# Patient Record
Sex: FEMALE | Race: WHITE
[De-identification: names, ages, dates, MRNs, and addresses within clinical notes are randomized per-mention and may not be internally consistent; named-entity substitution may affect disease eponyms.]

---

## 2017-12-04 LAB
BASOPHILS # BLD: 0.03 K/UL (ref 0–0.2)
BASOPHILS NFR BLD: 0.3 %
BUN SERPL-MCNC: 24 MG/DL (ref 7–18)
CALCIUM SERPL-MCNC: 9.6 MG/DL (ref 8.5–10.1)
CO2 SERPL-SCNC: 27 MMOL/L (ref 21–32)
CREAT SERPL-MCNC: 1.07 MG/DL (ref 0.6–1.2)
EOS ABS #: 0.65 K/UL (ref 0–0.5)
EOSINOPHIL NFR BLD AUTO: 226 K/UL (ref 130–400)
GLUCOSE SERPL-MCNC: 86 MG/DL (ref 70–99)
HCT VFR BLD CALC: 42.8 % (ref 37–47)
HGB BLD-MCNC: 14.7 G/DL (ref 12–16)
IG#: 0.02 K/UL (ref 0–0.02)
IMM GRANULOCYTES NFR BLD AUTO: 26.4 %
INR PPP: 0.9 (ref 0.9–1.1)
LYMPHOCYTES # BLD: 2.67 K/UL (ref 1.2–3.4)
MCH RBC QN AUTO: 29.9 PG (ref 25–34)
MCHC RBC AUTO-ENTMCNC: 34.3 G/DL (ref 32–36)
MCV RBC AUTO: 87.2 FL (ref 80–100)
MONO ABS #: 0.89 K/UL (ref 0.11–0.59)
MONOCYTES NFR BLD: 8.8 %
NEUT ABS #: 5.85 K/UL (ref 1.4–6.5)
NEUTROPHILS # BLD AUTO: 6.4 %
NEUTROPHILS NFR BLD AUTO: 57.9 %
PMV BLD AUTO: 9.8 FL (ref 7.4–10.4)
POTASSIUM SERPL-SCNC: 4.3 MMOL/L (ref 3.5–5.1)
PTT PATIENT: 24.9 SECONDS (ref 21–31)
RED CELL DISTRIBUTION WIDTH CV: 13.2 % (ref 11.5–14.5)
RED CELL DISTRIBUTION WIDTH SD: 42.2 FL (ref 36.4–46.3)
SODIUM SERPL-SCNC: 138 MMOL/L (ref 136–145)
WBC # BLD AUTO: 10.11 K/UL (ref 4.8–10.8)

## 2017-12-04 NOTE — PAT MEDICATION INSTRUCTIONS
Service Date


Dec 4, 2017.





Current Home Medication List


Aspirin (Aspirin Ec), 81 MG PO HS


Diclofenac (Voltaren), 75 MG PO BID PRN for PRN


Diltiazem Hcl Coated Beads (Cardizem Cd), 240 MG PO QAM


Fluticasone Propionate (Nasal) (Flonase Allergy Relief), 2 SPRAYS INTNAS QAM


Irbesartan (Avapro), 1 TAB PO QAM


Magnesium Oxide (Mag-Ox), 400 MG PO HS


Melatonin (Melatonin Maximum Strengt), 1 TAB PO HS


Multivitamin (Multivitamin), 1 TAB PO QAM


Naproxen (Aleve), 440 MG PO PRN


Omega-3 Fatty Acids (Fish Oil), 1 TAB PO BID


Omeprazole (Prilosec), 20 MG PO QAM





Medication Instructions


For Your Scheduled Surgery 





- Hold the following medications 2 weeks prior to surgery:


Omega-3 Fatty Acids (Fish Oil), 1 TAB PO BID











- Hold the following medications the morning of surgery:


Multivitamin (Multivitamin), 1 TAB PO QAM


Irbesartan (Avapro), 1 TAB PO QAM


Diclofenac (Voltaren), 75 MG PO BID PRN for PRN (otherwise okay to continue per 

surgeon)


Naproxen (Aleve), 440 MG PO PRN  (otherwise okay to continue per surgeon)








- Take the following medications the morning of surgery with a sip of water 

OTHERWISE NOTHING TO EAT OR DRINK AFTER MIDNIGHT:


Omeprazole (Prilosec), 20 MG PO QAM


Diltiazem Hcl Coated Beads (Cardizem Cd), 240 MG PO QAM


Fluticasone Propionate (Nasal) (Flonase Allergy Relief), 2 SPRAYS INTNAS QAM











- Take the following medications as scheduled the night before surgery:


Aspirin (Aspirin Ec), 81 MG PO HS


Magnesium Oxide (Mag-Ox), 400 MG PO HS


Melatonin (Melatonin Maximum Strengt), 1 TAB PO HS











If you have any questions please call us at [phone] or [phone] or 

[phone]

## 2017-12-04 NOTE — DIAGNOSTIC IMAGING REPORT
CHEST 2 VIEWS ROUTINE



CLINICAL HISTORY: PAT preoperative evaluation



COMPARISON STUDY:  No previous studies for comparison.



FINDINGS: The bones soft tissues and hemidiaphragms are normal. The

cardiomediastinal silhouette is normal. The lungs are clear. The pulmonary

vasculature is normal. 



IMPRESSION:  Negative chest. 











The above report was generated using voice recognition software.  It may contain

grammatical, syntax or spelling errors.









Electronically signed by:  Junacho Canada M.D.

12/4/2017 2:43 PM



Dictated Date/Time:  12/4/2017 2:43 PM

## 2017-12-28 NOTE — HISTORY & PHYSICAL EXAMINATION
DATE OF ADMISSION:  01/02/2018

 

CHIEF COMPLAINT:  Primary osteoarthritis of the right hip.

 

HISTORY OF PRESENT ILLNESS:  Lizet is a pleasant 62-year-old female who has

been dealing with chronic right hip pain for several years.  X-rays and

clinical examination were diagnostic for primary osteoarthritis of the right

hip.  After failing extensive conservative treatment, she has elected to

proceed with a right total hip arthroplasty.

 

PAST MEDICAL HISTORY:  Significant for hypertension, irregular heartbeats

when her magnesium was low, sleep apnea, osteoarthritis, GERD.

 

MEDICATIONS:  Include Cardizem 240 mg daily, Avapro 300 mg daily, Prilosec 20

mg daily, fish oil 1200 mg daily, Flonase daily, propionate 50 mcg daily,

aspirin 81 mg daily, magnesium daily, and melatonin 5 mg daily.

 

ALLERGIES:  Include no drug allergies.

 

PAST SURGICAL HISTORY:  Include tubal ligation in 1984, kidney stones in

2016, sclerotherapy EVLA in 2014, and a sinuplasty in 2015.

 

SOCIAL HISTORY:  She denies any tobacco, alcohol or IV drug use.

 

FAMILY HISTORY:  Noncontributory.

 

REVIEW OF SYSTEMS:  She complains of right hip and groin pain.  All other

pertinent review of systems is negative.

 

PHYSICAL EXAMINATION:

GENERAL:  She is awake, alert and oriented x3.  She is in no apparent

distress.  She is very pleasant.

HEENT:  Pupils are equal, round and reactive to light.  Extraocular motion is

intact.  Oral mucosa is pink and moist.

HEART:  Regular rate per radial pulse.

LUNGS:  Abril symmetrically bilaterally with no audible breath sounds.

ABDOMEN:  Soft, nontender, nondistended.

MUSCULOSKELETAL:  On physical examination of the right hip, her right leg is

a little bit shorter than the left.  She has a significant decreased range of

motion.  She has pain with forced internal and external rotation.  She is

neurovascularly intact.

 

IMAGING:  X-rays of the hip and pelvis show primary osteoarthritis with joint

space narrowing and osteophyte formation.

 

IMPRESSION:  Primary osteoarthritis of the right hip.

 

PLAN:  We will proceed with a right total hip arthroplasty.  Postoperatively,

she will be started on aspirin for DVT prophylaxis and kept in the hospital

for 2 midnights for postoperative medical management.

## 2018-01-02 ENCOUNTER — HOSPITAL ENCOUNTER (INPATIENT)
Dept: HOSPITAL 45 - C.ACU | Age: 63
LOS: 2 days | Discharge: HOME HEALTH | DRG: 470 | End: 2018-01-04
Attending: ORTHOPAEDIC SURGERY | Admitting: ORTHOPAEDIC SURGERY
Payer: COMMERCIAL

## 2018-01-02 VITALS
HEART RATE: 78 BPM | SYSTOLIC BLOOD PRESSURE: 117 MMHG | DIASTOLIC BLOOD PRESSURE: 72 MMHG | TEMPERATURE: 98.06 F | OXYGEN SATURATION: 92 %

## 2018-01-02 VITALS
SYSTOLIC BLOOD PRESSURE: 171 MMHG | DIASTOLIC BLOOD PRESSURE: 65 MMHG | HEART RATE: 77 BPM | OXYGEN SATURATION: 97 % | TEMPERATURE: 97.7 F

## 2018-01-02 VITALS
SYSTOLIC BLOOD PRESSURE: 123 MMHG | OXYGEN SATURATION: 96 % | TEMPERATURE: 97.52 F | DIASTOLIC BLOOD PRESSURE: 70 MMHG | HEART RATE: 76 BPM

## 2018-01-02 VITALS
BODY MASS INDEX: 33.98 KG/M2 | WEIGHT: 203.93 LBS | WEIGHT: 203.93 LBS | HEIGHT: 65 IN | BODY MASS INDEX: 33.98 KG/M2 | HEIGHT: 65 IN

## 2018-01-02 VITALS
SYSTOLIC BLOOD PRESSURE: 117 MMHG | HEART RATE: 79 BPM | TEMPERATURE: 97.7 F | OXYGEN SATURATION: 98 % | DIASTOLIC BLOOD PRESSURE: 72 MMHG

## 2018-01-02 VITALS
TEMPERATURE: 97.52 F | DIASTOLIC BLOOD PRESSURE: 70 MMHG | SYSTOLIC BLOOD PRESSURE: 118 MMHG | HEART RATE: 74 BPM | OXYGEN SATURATION: 96 %

## 2018-01-02 VITALS
DIASTOLIC BLOOD PRESSURE: 80 MMHG | SYSTOLIC BLOOD PRESSURE: 135 MMHG | OXYGEN SATURATION: 96 % | TEMPERATURE: 98.6 F | HEART RATE: 89 BPM

## 2018-01-02 VITALS
OXYGEN SATURATION: 97 % | HEART RATE: 76 BPM | DIASTOLIC BLOOD PRESSURE: 69 MMHG | TEMPERATURE: 97.88 F | SYSTOLIC BLOOD PRESSURE: 114 MMHG

## 2018-01-02 DIAGNOSIS — Z79.82: ICD-10-CM

## 2018-01-02 DIAGNOSIS — I10: ICD-10-CM

## 2018-01-02 DIAGNOSIS — Z79.899: ICD-10-CM

## 2018-01-02 DIAGNOSIS — G47.30: ICD-10-CM

## 2018-01-02 DIAGNOSIS — K21.9: ICD-10-CM

## 2018-01-02 DIAGNOSIS — M16.11: Primary | ICD-10-CM

## 2018-01-02 DIAGNOSIS — E66.9: ICD-10-CM

## 2018-01-02 PROCEDURE — 0SR904Z REPLACEMENT OF RIGHT HIP JOINT WITH CERAMIC ON POLYETHYLENE SYNTHETIC SUBSTITUTE, OPEN APPROACH: ICD-10-PCS | Performed by: ORTHOPAEDIC SURGERY

## 2018-01-02 RX ADMIN — SODIUM CHLORIDE SCH MLS/HR: 900 INJECTION, SOLUTION INTRAVENOUS at 14:45

## 2018-01-02 RX ADMIN — TRANEXAMIC ACID SCH MLS/MIN: 100 INJECTION, SOLUTION INTRAVENOUS at 14:50

## 2018-01-02 RX ADMIN — SODIUM CHLORIDE SCH MG: 9 INJECTION INTRAMUSCULAR; INTRAVENOUS; SUBCUTANEOUS at 18:04

## 2018-01-02 RX ADMIN — Medication SCH MG: at 20:55

## 2018-01-02 RX ADMIN — TRANEXAMIC ACID SCH MLS/MIN: 100 INJECTION, SOLUTION INTRAVENOUS at 06:00

## 2018-01-02 RX ADMIN — STANDARDIZED SENNA CONCENTRATE SCH MG: 8.6 TABLET ORAL at 20:51

## 2018-01-02 RX ADMIN — DOCUSATE SODIUM SCH MG: 100 CAPSULE, LIQUID FILLED ORAL at 20:52

## 2018-01-02 RX ADMIN — Medication SCH MG: at 20:51

## 2018-01-02 RX ADMIN — ACETAMINOPHEN SCH MLS/HR: 10 INJECTION, SOLUTION INTRAVENOUS at 16:08

## 2018-01-02 RX ADMIN — Medication SCH GM: at 20:52

## 2018-01-02 NOTE — DIAGNOSTIC IMAGING REPORT
R HIP UNILATERAL 1 VIEW



CLINICAL HISTORY: 62 years-old Female presenting with RT ANTERIOR HIP. 



TECHNIQUE: 2 fluoroscopic spot image(s) obtained as part of an intraoperative

procedure.



COMPARISON: 12/4/2017.



FINDINGS/IMPRESSION:

There has been interval total right hip arthroplasty. No gross malalignment or

hardware convocation.



Please see surgical report for further details.



Fluoroscopy dosage (mGy): 6.45.



Fluoroscopy time: 42.1 seconds.



Number of fluoroscopic spot images: 2.







Electronically signed by:  Joe Rogel M.D.

1/2/2018 12:22 PM



Dictated Date/Time:  1/2/2018 12:20 PM

## 2018-01-02 NOTE — ANESTHESIOLOGY PROGRESS NOTE
Anesthesia Post Op Note


Date & Time


Jan 2, 2018 at 13:12





Vital Signs


Pain Intensity:  0





Vital Signs Past 12 Hours








  Date Time  Temp Pulse Resp B/P (MAP) Pulse Ox O2 Delivery O2 Flow Rate FiO2


 


1/2/18 12:56    130/64    


 


1/2/18 12:52  79 14     


 


1/2/18 12:52  78 14  97   


 


1/2/18 12:51    116/62    


 


1/2/18 12:47  75 14     


 


1/2/18 12:47  75 14  97   


 


1/2/18 12:46    99/57    


 


1/2/18 12:45  73 15  97   


 


1/2/18 12:45  74 15     


 


1/2/18 12:41    106/62    


 


1/2/18 12:40  72 13  98   


 


1/2/18 12:40  72 13     


 


1/2/18 12:36    114/60    


 


1/2/18 12:35  72 16  95   


 


1/2/18 12:35 36.3 76 14 114/60 98 Oxymask 10 


 


1/2/18 12:35  72 16     


 


1/2/18 08:04 36.5 77 20 171/65 97 Room Air  











Notes


Mental Status:  alert / awake / arousable, participated in evaluation


Pt Amnestic to Procedure:  Yes


Nausea / Vomiting:  adequately controlled


Pain:  adequately controlled


Airway Patency, RR, SpO2:  stable & adequate


BP & HR:  stable & adequate


Hydration State:  stable & adequate


Neuraxial Anesthesia:  was administered, sensory block is resolving


Anesthetic Complications:  no major complications apparent

## 2018-01-02 NOTE — DIAGNOSTIC IMAGING REPORT
R PELVIS/UNILATERAL HIP 3 VIEW



CLINICAL HISTORY: Right hip arthroplasty    



COMPARISON STUDY:  No previous studies for comparison.



FINDINGS: There are postsurgical changes of a total right hip arthroplasty.

Overlying skin staples and surgical drains are evident. There is air within the

soft tissues consistent with recent surgery. No fractures or dislocations are

visualized.



IMPRESSION:  Postsurgical changes of a total right hip arthroplasty 









Electronically signed by:  Cholo Fitzgerald M.D.

1/2/2018 1:33 PM



Dictated Date/Time:  1/2/2018 1:33 PM

## 2018-01-02 NOTE — [facility] POST OPERATIVE BRIEF NOTE
Immediate Operative Summary


Operative Date


Jan 2, 2018.





Pre-Operative Diagnosis





Right Hip Degenerative Joint Disease





Post-Operative Diagnosis





Same as preop





Procedure(s) Performed





Right Anterior Total Hip Arthroplasty Uncemented





Surgeon


Dr. Steele





Assistant Surgeon(s)


Raymundo Biggs PA-C





Estimated Blood Loss


250 ml





Findings


as above





Specimens





A. Right Femoral Head





Complication(s)


None





Disposition


Recovery Room / PACU

## 2018-01-03 VITALS
TEMPERATURE: 98.96 F | HEART RATE: 82 BPM | DIASTOLIC BLOOD PRESSURE: 82 MMHG | SYSTOLIC BLOOD PRESSURE: 158 MMHG | OXYGEN SATURATION: 92 %

## 2018-01-03 VITALS
SYSTOLIC BLOOD PRESSURE: 128 MMHG | OXYGEN SATURATION: 96 % | TEMPERATURE: 98.6 F | HEART RATE: 80 BPM | DIASTOLIC BLOOD PRESSURE: 76 MMHG

## 2018-01-03 VITALS
SYSTOLIC BLOOD PRESSURE: 147 MMHG | HEART RATE: 74 BPM | TEMPERATURE: 98.6 F | DIASTOLIC BLOOD PRESSURE: 74 MMHG | OXYGEN SATURATION: 95 %

## 2018-01-03 VITALS
OXYGEN SATURATION: 94 % | HEART RATE: 74 BPM | DIASTOLIC BLOOD PRESSURE: 79 MMHG | SYSTOLIC BLOOD PRESSURE: 158 MMHG | TEMPERATURE: 98.24 F

## 2018-01-03 VITALS
TEMPERATURE: 98.42 F | DIASTOLIC BLOOD PRESSURE: 77 MMHG | HEART RATE: 85 BPM | SYSTOLIC BLOOD PRESSURE: 136 MMHG | OXYGEN SATURATION: 93 %

## 2018-01-03 VITALS — OXYGEN SATURATION: 92 %

## 2018-01-03 LAB
BASOPHILS # BLD: 0.01 K/UL (ref 0–0.2)
BASOPHILS NFR BLD: 0.1 %
BUN SERPL-MCNC: 26 MG/DL (ref 7–18)
CALCIUM SERPL-MCNC: 8.5 MG/DL (ref 8.5–10.1)
CO2 SERPL-SCNC: 23 MMOL/L (ref 21–32)
CREAT SERPL-MCNC: 1.16 MG/DL (ref 0.6–1.2)
EOS ABS #: 0 K/UL (ref 0–0.5)
EOSINOPHIL NFR BLD AUTO: 178 K/UL (ref 130–400)
GLUCOSE SERPL-MCNC: 122 MG/DL (ref 70–99)
HCT VFR BLD CALC: 33 % (ref 37–47)
HGB BLD-MCNC: 11.6 G/DL (ref 12–16)
IG#: 0.04 K/UL (ref 0–0.02)
IMM GRANULOCYTES NFR BLD AUTO: 7.9 %
LYMPHOCYTES # BLD: 1.01 K/UL (ref 1.2–3.4)
MCH RBC QN AUTO: 30.1 PG (ref 25–34)
MCHC RBC AUTO-ENTMCNC: 35.2 G/DL (ref 32–36)
MCV RBC AUTO: 85.7 FL (ref 80–100)
MONO ABS #: 0.75 K/UL (ref 0.11–0.59)
MONOCYTES NFR BLD: 5.9 %
NEUT ABS #: 10.96 K/UL (ref 1.4–6.5)
NEUTROPHILS # BLD AUTO: 0 %
NEUTROPHILS NFR BLD AUTO: 85.8 %
PMV BLD AUTO: 9.8 FL (ref 7.4–10.4)
POTASSIUM SERPL-SCNC: 5.1 MMOL/L (ref 3.5–5.1)
RED CELL DISTRIBUTION WIDTH CV: 13 % (ref 11.5–14.5)
RED CELL DISTRIBUTION WIDTH SD: 40.7 FL (ref 36.4–46.3)
SODIUM SERPL-SCNC: 138 MMOL/L (ref 136–145)
WBC # BLD AUTO: 12.77 K/UL (ref 4.8–10.8)

## 2018-01-03 RX ADMIN — ACETAMINOPHEN SCH MLS/HR: 10 INJECTION, SOLUTION INTRAVENOUS at 00:09

## 2018-01-03 RX ADMIN — ACETAMINOPHEN SCH MLS/HR: 10 INJECTION, SOLUTION INTRAVENOUS at 08:40

## 2018-01-03 RX ADMIN — Medication SCH MG: at 08:40

## 2018-01-03 RX ADMIN — SODIUM CHLORIDE SCH MG: 9 INJECTION INTRAMUSCULAR; INTRAVENOUS; SUBCUTANEOUS at 00:09

## 2018-01-03 RX ADMIN — DOCUSATE SODIUM SCH MG: 100 CAPSULE, LIQUID FILLED ORAL at 20:30

## 2018-01-03 RX ADMIN — DILTIAZEM HYDROCHLORIDE SCH MG: 240 CAPSULE, EXTENDED RELEASE ORAL at 08:45

## 2018-01-03 RX ADMIN — SODIUM CHLORIDE SCH MG: 9 INJECTION INTRAMUSCULAR; INTRAVENOUS; SUBCUTANEOUS at 15:04

## 2018-01-03 RX ADMIN — Medication SCH GM: at 20:30

## 2018-01-03 RX ADMIN — Medication SCH GM: at 08:42

## 2018-01-03 RX ADMIN — OXYCODONE HYDROCHLORIDE PRN MG: 5 TABLET ORAL at 22:14

## 2018-01-03 RX ADMIN — Medication SCH MG: at 20:30

## 2018-01-03 RX ADMIN — STANDARDIZED SENNA CONCENTRATE SCH MG: 8.6 TABLET ORAL at 20:30

## 2018-01-03 RX ADMIN — PANTOPRAZOLE SCH MG: 40 TABLET, DELAYED RELEASE ORAL at 07:55

## 2018-01-03 RX ADMIN — ACETAMINOPHEN SCH MG: 500 TABLET, COATED ORAL at 22:12

## 2018-01-03 RX ADMIN — SODIUM CHLORIDE SCH MLS/HR: 900 INJECTION, SOLUTION INTRAVENOUS at 08:45

## 2018-01-03 RX ADMIN — Medication SCH TAB: at 08:40

## 2018-01-03 RX ADMIN — FLUTICASONE PROPIONATE SCH SPRAYS: 50 SPRAY, METERED NASAL at 08:43

## 2018-01-03 RX ADMIN — SODIUM CHLORIDE SCH MLS/HR: 900 INJECTION, SOLUTION INTRAVENOUS at 00:34

## 2018-01-03 RX ADMIN — DOCUSATE SODIUM SCH MG: 100 CAPSULE, LIQUID FILLED ORAL at 08:42

## 2018-01-03 RX ADMIN — SODIUM CHLORIDE SCH MG: 9 INJECTION INTRAMUSCULAR; INTRAVENOUS; SUBCUTANEOUS at 06:40

## 2018-01-03 RX ADMIN — IRBESARTAN SCH MG: 150 TABLET ORAL at 08:44

## 2018-01-03 RX ADMIN — SODIUM CHLORIDE SCH MG: 9 INJECTION INTRAMUSCULAR; INTRAVENOUS; SUBCUTANEOUS at 20:31

## 2018-01-03 NOTE — PROGRESS NOTE
DATE: 01/03/2018

 

CHIEF COMPLAINT:  Status post right total hip arthroplasty postop day #1.

 

PROGRESS:  Lizet was seen and examined at bedside today.  Overall, she is

doing fairly well.  She has been up and using the bathroom.  She does not

have too much pain in her hip and she has no complaints.

 

PHYSICAL EXAMINATION:  The Prevena VAC dressing is to suction and the drain

is to suction.  Her leg lengths are almost equal.  She has active

dorsiflexion and plantarflexion of her right ankle and sensation is intact.

 

LABORATORY DATA:  She has an H&H today of 11.6 and 33.0.  Her PRP is pending.

 Her vital signs are stable on room air and she is voiding on her own.

 

X-rays postoperatively of the right hip showed the prosthesis to be in

anatomical alignment without any evidence of fracture, dislocation or

loosening.

 

IMPRESSION:  Status post right total hip arthroplasty postop day #1.

 

PLAN:  At this point, she is doing as well as expected.  She will be up and

ambulating with physical therapy today.  Tomorrow the nursing staff can pull

the drain and will likely discharge her to home.

## 2018-01-03 NOTE — DISCHARGE INSTRUCTIONS
Discharge Instructions


Date of Service


Mateo 3, 2018.





Admission


Reason for Admission:  Right Hip Degenerative Joint Disease





Discharge


Discharge Diagnosis / Problem:  Right Total Hip





Discharge Goals


Goal(s):  Decrease discomfort, Improve function





Activity Recommendations


Activity Limitations:  as noted below





.





Instructions / Follow-Up


Instructions / Follow-Up





Activity and Therapy Recommendations:





* If you are using [facility] then Physical Therapy will be provided 

until they feel you are ready to start Outpatient Physical Therapy.  If you are 

not using a Home Health agency then Outpatient Physical Therapy should start 

about 3-5 days from your day of surgery.  Therapy will last about 3-6 weeks





* You were shown a series of exercises in the hospital. Do these exercises 

three times each day including the exercises you were shown in physical therapy.





* Get up and walk several times each day.~ For the first four weeks, try not to 

stand or walk for more than one hour at a time. If you do stand or walk for 

more than one hour, you will not hurt anything, but your leg will likely swell.~

~





* As you feel comfortable, you may change from the walker or crutches to a cane 

and~then to independent walking.





Medications:





* Narcotic  You will likely be sent home from the hospital with a prescription 

for the narcotic pain medication that worked best throughout your stay.





* Aspirin  Most patients will be required to take Aspirin 325mg twice a day for 

6 weeks after surgery.  This is obtained over-the-counter and a prescription is 

not necessary.  





* Other medications may be prescribed for specific circumstances.  If you have 

any questions, please call the office at [phone].





* Resume previous home medications unless otherwise instructed








TEDs/Elastic Stockings:





The white elastic stockings help limit swelling and prevent blood clots from 

forming in your legs. The more you wear them, the more they work.  Wear them 

for six weeks.





Dressing Care:





Remove VAC sponge dressing at 10 days if it is still in place








Showering:





May shower with the VAC dressing. Do not let the shower spray directly hit the 

dressing





Things To Watch For:





* Drainage from the incision site that occurs more than one week after your 

surgery.





* Increased redness at the incision site.





* Fever above 102 degrees Fahrenheit.





* Unusual chest pain or shortness of breath.





* Call [facility] at [phone] with any of the above 

problems 





Follow-Up Visit:





Follow-up with Dr. Steele 2 weeks after your day of surgery.  An appointment 

was probably scheduled when you signed-up for surgery in the office.  If you 

have any questions call [phone]





Office Instructions:





More detailed instructions as well as Frequently Asked Questions were provided 

in a folder by our office when you signed-up for surgery.  Please review these 

instructions when you get home.  If you have any further questions or concerns, 

please feel free to call the office at [phone]





Current Hospital Diet


Patient's current hospital diet: Regular Diet





Discharge Diet


Recommended Diet:  Regular Diet





Procedures


Procedures Performed:  


Right Anterior Total Hip Arthroplasty Uncemented





Pending Studies


Studies pending at discharge:  no





Medical Emergencies








.


Who to Call and When:





Medical Emergencies:  If at any time you feel your situation is an emergency, 

please call 911 immediately.





.





Non-Emergent Contact


Non-Emergency issues call your:  Surgeon


Call Non-Emergent contact if:  wound has increased drainage, wound has 

increased redness


.








"Provider Documentation" section prepared by Nate Steele.








.





VTE Core Measure


Inpt VTE Proph given/why not?:  Other Anticoagulation (Aspirin 325 twice a day 

for 6 weeks)

## 2018-01-04 VITALS
HEART RATE: 66 BPM | DIASTOLIC BLOOD PRESSURE: 77 MMHG | SYSTOLIC BLOOD PRESSURE: 132 MMHG | OXYGEN SATURATION: 92 % | TEMPERATURE: 97.88 F

## 2018-01-04 VITALS
SYSTOLIC BLOOD PRESSURE: 132 MMHG | OXYGEN SATURATION: 92 % | TEMPERATURE: 97.88 F | HEART RATE: 66 BPM | DIASTOLIC BLOOD PRESSURE: 77 MMHG

## 2018-01-04 RX ADMIN — SODIUM CHLORIDE SCH MG: 9 INJECTION INTRAMUSCULAR; INTRAVENOUS; SUBCUTANEOUS at 00:20

## 2018-01-04 RX ADMIN — SODIUM CHLORIDE SCH MG: 9 INJECTION INTRAMUSCULAR; INTRAVENOUS; SUBCUTANEOUS at 11:42

## 2018-01-04 RX ADMIN — PANTOPRAZOLE SCH MG: 40 TABLET, DELAYED RELEASE ORAL at 07:03

## 2018-01-04 RX ADMIN — Medication SCH MG: at 07:09

## 2018-01-04 RX ADMIN — FLUTICASONE PROPIONATE SCH SPRAYS: 50 SPRAY, METERED NASAL at 07:02

## 2018-01-04 RX ADMIN — ACETAMINOPHEN SCH MG: 500 TABLET, COATED ORAL at 13:47

## 2018-01-04 RX ADMIN — OXYCODONE HYDROCHLORIDE PRN MG: 5 TABLET ORAL at 07:08

## 2018-01-04 RX ADMIN — DOCUSATE SODIUM SCH MG: 100 CAPSULE, LIQUID FILLED ORAL at 07:08

## 2018-01-04 RX ADMIN — DILTIAZEM HYDROCHLORIDE SCH MG: 240 CAPSULE, EXTENDED RELEASE ORAL at 07:09

## 2018-01-04 RX ADMIN — Medication SCH TAB: at 07:08

## 2018-01-04 RX ADMIN — Medication SCH GM: at 07:09

## 2018-01-04 RX ADMIN — IRBESARTAN SCH MG: 150 TABLET ORAL at 07:09

## 2018-01-04 RX ADMIN — SODIUM CHLORIDE SCH MG: 9 INJECTION INTRAMUSCULAR; INTRAVENOUS; SUBCUTANEOUS at 05:16

## 2018-01-04 RX ADMIN — ACETAMINOPHEN SCH MG: 500 TABLET, COATED ORAL at 05:16

## 2018-01-04 NOTE — DISCHARGE SUMMARY
DISCHARGE DIAGNOSIS:  Primary osteoarthritis of the right hip.

 

PROCEDURE:  Right total hip arthroplasty on 01/02/2018 by Dr. Nate Steele.

 

DISCHARGE INSTRUCTIONS:

1.  Aspirin 325 mg twice a day for 6 weeks.

2.  Oxycodone 5-10 mg every 4 hours as needed for pain.

3.  Voltaren 75 mg as needed.

4.  Cardizem 240 mg daily.

5.  Flonase 2 sprays in the morning.

6.  Avapro 300 mg daily.

7.  Magnesium 400 mg at night.

8.  Melatonin 5 mg at night.

9.  Aleve as needed for pain.

10.  Prilosec 20 mg daily.

11.  Daily multivitamin.

12.  Weight bear as tolerated.

13.  Pull off Prevena VAC dressing if it still on it 10 days.

14.  Follow up with Dr. Steele in 2 weeks.

15.  Call the office of Dr. Steele with any questions or concerns.

 

HOSPITAL COURSE:  Lizet is a pleasant 62-year-old female who presented to my

office with chronic right hip and groin pain.  X-rays and clinical

examination were diagnostic for primary osteoarthritis of the right hip. 

After failing conservative treatment, she elected to undergo a right total

hip arthroplasty.  On 01/02/2018, she arrived at [facility] and

underwent a right hip replacement without complication.  She had a spinal

anesthetic that was converted to a general.  She was started on aspirin 325

mg twice a day for DVT prophylaxis postoperatively.  Her hospital course was

uneventful.  On postop day #1, her H&H was stable at 11.6 and 33.0.  She was

up and ambulating well with physical therapy.  Her pain was controlled.  On

postop day #2, the Hemovac drain was pulled and she continued to work well

with physical therapy.  Her pain was controlled and she was subsequently

discharged to home with home health and the above instructions.

## 2018-01-04 NOTE — PROGRESS NOTE
DATE: 01/04/2018

 

CHIEF COMPLAINT:  Status post right total hip arthroplasty postop day #2.

 

PROGRESS:  Lizet was seen and examined at bedside today.  Overall, she is

doing very well.  She was up and ambulating well with physical therapy.  Her

pain is controlled.  She has no complaints.

 

PHYSICAL EXAMINATION:

RIGHT HIP:  The Prevena VAC dressing is to suction.  The Hemovac drain has

been pulled.  She has active dorsiflexion and plantarflexion of her right

ankle and active extension of her knee.

 

IMPRESSION:  Status post right total hip arthroplasty postop day #2.

 

PLAN:  At this point, she is doing well and happy with her progress.  She

will continue physical therapy today.  The drain has been pulled.  She can be

discharged to home with home health later today.

## 2018-01-16 NOTE — OPERATIVE REPORT
DATE OF OPERATION:  01/02/2018

 

PREOPERATIVE DIAGNOSIS:  Primary osteoarthritis of the right hip.

 

POSTOPERATIVE DIAGNOSIS:  Same.

 

PROCEDURE:  Right total hip arthroplasty.

 

SURGEON:  Dr. Nate Steele.

 

ASSISTANT:  Raymundo Biggs PA-C, whose assistance was necessary for positioning

the leg and helping with instrumentation and closure.

 

ANESTHESIA:  Spinal.

 

COMPLICATIONS:  None.

 

CONDITION:  Stable to PACU.

 

IMPLANTS USED:  I used a [employer] Taperloc total hip arthroplasty system with a

size 11 pressfit Taperloc stem, a size 32 ceramic head with a -3 neck and a

size 48 G7 acetabular cup.

 

INDICATIONS:  Lizet is a pleasant female who presented to my office with

complaints of chronic right hip and groin pain.  X-rays and clinical

examination were diagnostic for primary osteoarthritis of the right hip. 

After failing an extensive conservative treatment, she elected to undergo a

right total hip arthroplasty.

 

DESCRIPTION OF PROCEDURE:  On 01/02/2018, she arrived at [facility] for the above procedure.  She was seen in the preoperative holding

area and the operative extremity was identified and signed.  She was given a

preoperative antibiotic and a spinal anesthetic.  She was taken back to the

operating room, laid on the table the in supine position, and given basic

sedation.  The right hip was then prepped and draped in sterile fashion. 

Time-out was done and the patient's operative extremity was properly

identified.

 

An anterior approach was used.  Dissection was taken down through the fascia

and the vastus was retracted laterally and the rectus was retracted medially.

 The circumflex vessels were ligated and the capsule was exposed.  The

capsule was then incised and tagged for later closure.  The femoral neck was

then resected and the femoral head was removed.  The acetabulum was exposed. 

Time was spent doing a complete circumferential labral release.  Sequential

reaming up to a size 47 reamer was done.  Appropriate version was checked

under fluoroscopy.  A 48-mm G7 cup was then impacted into place.  A single

screw was placed followed by a polyethylene liner.  The proximal femur was

then exposed.  Sequential broaching up to a size 11 broach was done.  A trial

size 32 head with a standard neck was used.  The hip was reduced.  I was

happy with the size of the components.  The leg looked a little bit long. 

The hip was then dislocated.  A final size 11 implant was then impacted into

place.  A size 32 head with a -3 neck was then impacted into place and the

hip was reduced.  Final fluoroscopic images showed anatomical alignment and

the appropriate leg lengths.  The surrounding soft tissues were then injected

with 100 mL of an orthopedic pain control cocktail.  The wound was then

irrigated with 3 liters of normal saline solution with bacitracin.  The

capsule was then closed with #1 Vicryl suture and the drain was placed. 

Fascia was closed with #1 PDS.  Skin was closed with 2-0 Vicryl, 3-0 V-Loc

suture and staples.  A Prevena VAC dressing was placed.  She was then taken

to the postanesthesia care unit in stable condition.  She tolerated the

procedure well.

 

 

I attest to the content of the Intraoperative Record and any orders documented therein. Any exception
s are noted below.